# Patient Record
Sex: FEMALE | Race: WHITE | NOT HISPANIC OR LATINO | Employment: FULL TIME | URBAN - NONMETROPOLITAN AREA
[De-identification: names, ages, dates, MRNs, and addresses within clinical notes are randomized per-mention and may not be internally consistent; named-entity substitution may affect disease eponyms.]

---

## 2024-06-07 ENCOUNTER — OFFICE VISIT (OUTPATIENT)
Dept: FAMILY MEDICINE CLINIC | Facility: CLINIC | Age: 24
End: 2024-06-07
Payer: COMMERCIAL

## 2024-06-07 ENCOUNTER — LAB (OUTPATIENT)
Dept: LAB | Facility: HOSPITAL | Age: 24
End: 2024-06-07
Payer: COMMERCIAL

## 2024-06-07 VITALS
BODY MASS INDEX: 55.32 KG/M2 | WEIGHT: 293 LBS | SYSTOLIC BLOOD PRESSURE: 141 MMHG | HEIGHT: 61 IN | HEART RATE: 92 BPM | TEMPERATURE: 97.8 F | OXYGEN SATURATION: 100 % | DIASTOLIC BLOOD PRESSURE: 86 MMHG

## 2024-06-07 DIAGNOSIS — Z11.59 NEED FOR HEPATITIS C SCREENING TEST: ICD-10-CM

## 2024-06-07 DIAGNOSIS — R73.9 ELEVATED BLOOD SUGAR: ICD-10-CM

## 2024-06-07 DIAGNOSIS — Z76.89 ENCOUNTER TO ESTABLISH CARE: ICD-10-CM

## 2024-06-07 DIAGNOSIS — Z13.29 SCREENING FOR THYROID DISORDER: ICD-10-CM

## 2024-06-07 DIAGNOSIS — E66.01 CLASS 3 SEVERE OBESITY WITHOUT SERIOUS COMORBIDITY WITH BODY MASS INDEX (BMI) OF 50.0 TO 59.9 IN ADULT, UNSPECIFIED OBESITY TYPE: ICD-10-CM

## 2024-06-07 DIAGNOSIS — Z00.00 ANNUAL PHYSICAL EXAM: ICD-10-CM

## 2024-06-07 DIAGNOSIS — Z13.220 SCREENING, LIPID: ICD-10-CM

## 2024-06-07 DIAGNOSIS — Z13.220 SCREENING, LIPID: Primary | ICD-10-CM

## 2024-06-07 PROBLEM — E66.813 CLASS 3 SEVERE OBESITY WITHOUT SERIOUS COMORBIDITY WITH BODY MASS INDEX (BMI) OF 50.0 TO 59.9 IN ADULT: Status: ACTIVE | Noted: 2024-06-07

## 2024-06-07 LAB
ALBUMIN SERPL-MCNC: 4.4 G/DL (ref 3.5–5.2)
ALBUMIN/GLOB SERPL: 1.5 G/DL
ALP SERPL-CCNC: 78 U/L (ref 39–117)
ALT SERPL W P-5'-P-CCNC: 29 U/L (ref 1–33)
ANION GAP SERPL CALCULATED.3IONS-SCNC: 13.3 MMOL/L (ref 5–15)
AST SERPL-CCNC: 18 U/L (ref 1–32)
BASOPHILS # BLD AUTO: 0.05 10*3/MM3 (ref 0–0.2)
BASOPHILS NFR BLD AUTO: 0.5 % (ref 0–1.5)
BILIRUB SERPL-MCNC: <0.2 MG/DL (ref 0–1.2)
BUN SERPL-MCNC: 12 MG/DL (ref 6–20)
BUN/CREAT SERPL: 16 (ref 7–25)
CALCIUM SPEC-SCNC: 9.9 MG/DL (ref 8.6–10.5)
CHLORIDE SERPL-SCNC: 103 MMOL/L (ref 98–107)
CHOLEST SERPL-MCNC: 208 MG/DL (ref 0–200)
CO2 SERPL-SCNC: 21.7 MMOL/L (ref 22–29)
CREAT SERPL-MCNC: 0.75 MG/DL (ref 0.57–1)
DEPRECATED RDW RBC AUTO: 39 FL (ref 37–54)
EGFRCR SERPLBLD CKD-EPI 2021: 114.2 ML/MIN/1.73
EOSINOPHIL # BLD AUTO: 0.23 10*3/MM3 (ref 0–0.4)
EOSINOPHIL NFR BLD AUTO: 2.3 % (ref 0.3–6.2)
ERYTHROCYTE [DISTWIDTH] IN BLOOD BY AUTOMATED COUNT: 12.9 % (ref 12.3–15.4)
GLOBULIN UR ELPH-MCNC: 2.9 GM/DL
GLUCOSE SERPL-MCNC: 91 MG/DL (ref 65–99)
HBA1C MFR BLD: 5.7 % (ref 4.8–5.6)
HCT VFR BLD AUTO: 40.2 % (ref 34–46.6)
HCV AB SER QL: NORMAL
HDLC SERPL-MCNC: 58 MG/DL (ref 40–60)
HGB BLD-MCNC: 13.3 G/DL (ref 12–15.9)
IMM GRANULOCYTES # BLD AUTO: 0.03 10*3/MM3 (ref 0–0.05)
IMM GRANULOCYTES NFR BLD AUTO: 0.3 % (ref 0–0.5)
LDLC SERPL CALC-MCNC: 135 MG/DL (ref 0–100)
LDLC/HDLC SERPL: 2.29 {RATIO}
LYMPHOCYTES # BLD AUTO: 3.37 10*3/MM3 (ref 0.7–3.1)
LYMPHOCYTES NFR BLD AUTO: 33.2 % (ref 19.6–45.3)
MCH RBC QN AUTO: 27.8 PG (ref 26.6–33)
MCHC RBC AUTO-ENTMCNC: 33.1 G/DL (ref 31.5–35.7)
MCV RBC AUTO: 84.1 FL (ref 79–97)
MONOCYTES # BLD AUTO: 0.7 10*3/MM3 (ref 0.1–0.9)
MONOCYTES NFR BLD AUTO: 6.9 % (ref 5–12)
NEUTROPHILS NFR BLD AUTO: 5.78 10*3/MM3 (ref 1.7–7)
NEUTROPHILS NFR BLD AUTO: 56.8 % (ref 42.7–76)
NRBC BLD AUTO-RTO: 0 /100 WBC (ref 0–0.2)
PLATELET # BLD AUTO: 421 10*3/MM3 (ref 140–450)
PMV BLD AUTO: 12.1 FL (ref 6–12)
POTASSIUM SERPL-SCNC: 4.5 MMOL/L (ref 3.5–5.2)
PROT SERPL-MCNC: 7.3 G/DL (ref 6–8.5)
RBC # BLD AUTO: 4.78 10*6/MM3 (ref 3.77–5.28)
SODIUM SERPL-SCNC: 138 MMOL/L (ref 136–145)
TRIGL SERPL-MCNC: 86 MG/DL (ref 0–150)
TSH SERPL DL<=0.05 MIU/L-ACNC: 3.6 UIU/ML (ref 0.27–4.2)
VLDLC SERPL-MCNC: 15 MG/DL (ref 5–40)
WBC NRBC COR # BLD AUTO: 10.16 10*3/MM3 (ref 3.4–10.8)

## 2024-06-07 PROCEDURE — 80061 LIPID PANEL: CPT

## 2024-06-07 PROCEDURE — 86803 HEPATITIS C AB TEST: CPT

## 2024-06-07 PROCEDURE — 99204 OFFICE O/P NEW MOD 45 MIN: CPT

## 2024-06-07 PROCEDURE — 83036 HEMOGLOBIN GLYCOSYLATED A1C: CPT

## 2024-06-07 PROCEDURE — 36415 COLL VENOUS BLD VENIPUNCTURE: CPT

## 2024-06-07 PROCEDURE — 80050 GENERAL HEALTH PANEL: CPT

## 2024-06-07 RX ORDER — CETIRIZINE HYDROCHLORIDE 10 MG/1
10 TABLET ORAL DAILY
COMMUNITY

## 2024-06-07 NOTE — PROGRESS NOTES
"Chief Complaint  Establish Care and Weight Loss    Subjective        Lisa Leslie presents to [facility]  History of Present Illness  Lisa is being seen in office today to establish care as a new patient and discuss weight loss. She states that she has been on a few medications in the past, but would like to try Wegovy. She was advised on having her labs drawn prior to starting any medication and she was advised the side effects of the medication. She stated that she has been fasting today, so she was advised to have her labs drawn today, so she can be started on medication. She was advised to follow up in the office after 4 weeks of starting medication to see how she was doing with the new medication.       Objective   Vital Signs:  /86 (BP Location: Left arm, Patient Position: Sitting, Cuff Size: Adult)   Pulse 92   Temp 97.8 °F (36.6 °C)   Ht 154.9 cm (61\")   Wt (!) 139 kg (306 lb 3.2 oz)   SpO2 100%   BMI 57.86 kg/m²   Estimated body mass index is 57.86 kg/m² as calculated from the following:    Height as of this encounter: 154.9 cm (61\").    Weight as of this encounter: 139 kg (306 lb 3.2 oz).             Physical Exam  Constitutional:       Appearance: Normal appearance.   HENT:      Nose: Nose normal.      Mouth/Throat:      Mouth: Mucous membranes are moist.   Eyes:      Pupils: Pupils are equal, round, and reactive to light.   Cardiovascular:      Rate and Rhythm: Normal rate and regular rhythm.      Pulses: Normal pulses.      Heart sounds: Normal heart sounds.   Pulmonary:      Effort: Pulmonary effort is normal.      Breath sounds: Normal breath sounds.   Skin:     General: Skin is warm and dry.   Neurological:      General: No focal deficit present.      Mental Status: She is alert and oriented to person, place, and time.   Psychiatric:         Mood and Affect: Mood normal.         Behavior: Behavior normal.        Result Review :                   "   Assessment and Plan     Diagnoses and all orders for this visit:    1. Screening, lipid (Primary)  -     Lipid panel; Future    2. Encounter to establish care    3. Annual physical exam  -     CBC w AUTO Differential; Future  -     Comprehensive metabolic panel; Future    4. Class 3 severe obesity without serious comorbidity with body mass index (BMI) of 50.0 to 59.9 in adult, unspecified obesity type  Assessment & Plan:  Patient's (Body mass index is 57.86 kg/m².) indicates that they are morbidly/severely obese (BMI > 40 or > 35 with obesity - related health condition) with health conditions that include none . Weight is newly identified. BMI  is above average; BMI management plan is completed. We discussed portion control and increasing exercise.       5. Need for hepatitis C screening test  -     Hepatitis C Antibody; Future    6. Screening for thyroid disorder  -     TSH; Future    7. Elevated blood sugar  -     Hemoglobin A1c; Future             Follow Up     Return in about 4 weeks (around 7/5/2024), or if symptoms worsen or fail to improve.  Patient was given instructions and counseling regarding her condition or for health maintenance advice. Please see specific information pulled into the AVS if appropriate.

## 2024-06-07 NOTE — ASSESSMENT & PLAN NOTE
Patient's (Body mass index is 57.86 kg/m².) indicates that they are morbidly/severely obese (BMI > 40 or > 35 with obesity - related health condition) with health conditions that include none . Weight is newly identified. BMI  is above average; BMI management plan is completed. We discussed portion control and increasing exercise.

## 2024-06-11 NOTE — TELEPHONE ENCOUNTER
Caller: Lisa Leslie    Relationship to patient: Self    Best call back number: [phone]    Patient is needing: PATIENT STATED WEGOVY MEDICATION HAS STILL NOT BEEN SENT IN TO:     [facility] - [address] - [phone]  - [phone]  [phone]

## 2024-07-01 ENCOUNTER — OFFICE VISIT (OUTPATIENT)
Dept: FAMILY MEDICINE CLINIC | Facility: CLINIC | Age: 24
End: 2024-07-01
Payer: COMMERCIAL

## 2024-07-01 VITALS
HEIGHT: 61 IN | DIASTOLIC BLOOD PRESSURE: 71 MMHG | TEMPERATURE: 98.6 F | WEIGHT: 293 LBS | BODY MASS INDEX: 55.32 KG/M2 | HEART RATE: 83 BPM | OXYGEN SATURATION: 100 % | SYSTOLIC BLOOD PRESSURE: 122 MMHG

## 2024-07-01 DIAGNOSIS — E66.01 CLASS 3 SEVERE OBESITY WITHOUT SERIOUS COMORBIDITY WITH BODY MASS INDEX (BMI) OF 50.0 TO 59.9 IN ADULT, UNSPECIFIED OBESITY TYPE: Primary | ICD-10-CM

## 2024-07-01 PROCEDURE — 99213 OFFICE O/P EST LOW 20 MIN: CPT

## 2024-07-01 RX ORDER — SEMAGLUTIDE 0.5 MG/.5ML
0.5 INJECTION, SOLUTION SUBCUTANEOUS WEEKLY
Qty: 2 ML | Refills: 1 | Status: SHIPPED | OUTPATIENT
Start: 2024-07-01

## 2024-07-01 NOTE — PROGRESS NOTES
"Chief Complaint  Weight Loss (1mth follow up for wygovy )    Subjective        Lisa Leslie presents to [facility]  History of Present Illness  Lisa is here to follow  up on her weight. She started wegovy about 2.5 weeks ago. She states it worked for about 3-4 days the first week and then didn't feel anything the second week but she does have a bruise on her leg where it went in and she's not sure she even got much of the dose.       Objective   Vital Signs:  /71 (BP Location: Left arm, Patient Position: Sitting, Cuff Size: Adult)   Pulse 83   Temp 98.6 °F (37 °C)   Ht 154.9 cm (61\")   Wt (!) 142 kg (312 lb)   SpO2 100%   BMI 58.95 kg/m²   Estimated body mass index is 58.95 kg/m² as calculated from the following:    Height as of this encounter: 154.9 cm (61\").    Weight as of this encounter: 142 kg (312 lb).               Physical Exam  Constitutional:       Appearance: Normal appearance.   HENT:      Nose: Nose normal.      Mouth/Throat:      Mouth: Mucous membranes are moist.   Cardiovascular:      Rate and Rhythm: Normal rate and regular rhythm.      Pulses: Normal pulses.      Heart sounds: Normal heart sounds.   Pulmonary:      Effort: Pulmonary effort is normal.      Breath sounds: Normal breath sounds.   Skin:     General: Skin is warm and dry.   Neurological:      General: No focal deficit present.      Mental Status: She is alert and oriented to person, place, and time.   Psychiatric:         Mood and Affect: Mood normal.         Behavior: Behavior normal.        Result Review :                     Assessment and Plan     Diagnoses and all orders for this visit:    1. Class 3 severe obesity without serious comorbidity with body mass index (BMI) of 50.0 to 59.9 in adult, unspecified obesity type (Primary)  -     Semaglutide-Weight Management (Wegovy) 0.5 MG/0.5ML solution auto-injector; Inject 0.5 mL under the skin into the appropriate area as directed 1 " (One) Time Per Week.  Dispense: 2 mL; Refill: 1             Follow Up     Return if symptoms worsen or fail to improve.  Patient was given instructions and counseling regarding her condition or for health maintenance advice. Please see specific information pulled into the AVS if appropriate.

## 2024-08-26 DIAGNOSIS — E66.01 CLASS 3 SEVERE OBESITY WITHOUT SERIOUS COMORBIDITY WITH BODY MASS INDEX (BMI) OF 50.0 TO 59.9 IN ADULT, UNSPECIFIED OBESITY TYPE: ICD-10-CM

## 2024-08-26 RX ORDER — SEMAGLUTIDE 0.5 MG/.5ML
0.5 INJECTION, SOLUTION SUBCUTANEOUS WEEKLY
Qty: 2 ML | Refills: 1 | OUTPATIENT
Start: 2024-08-26

## 2024-08-26 RX ORDER — SEMAGLUTIDE 1 MG/.5ML
1 INJECTION, SOLUTION SUBCUTANEOUS WEEKLY
Qty: 2 ML | Refills: 0 | Status: SHIPPED | OUTPATIENT
Start: 2024-08-26

## 2024-08-28 RX ORDER — SEMAGLUTIDE 1 MG/.5ML
1 INJECTION, SOLUTION SUBCUTANEOUS WEEKLY
Qty: 2 ML | Refills: 0 | OUTPATIENT
Start: 2024-08-28

## 2024-10-21 RX ORDER — SEMAGLUTIDE 1 MG/.5ML
1 INJECTION, SOLUTION SUBCUTANEOUS WEEKLY
Qty: 2 ML | Refills: 0 | Status: SHIPPED | OUTPATIENT
Start: 2024-10-21